# Patient Record
Sex: FEMALE | Employment: UNEMPLOYED | URBAN - METROPOLITAN AREA
[De-identification: names, ages, dates, MRNs, and addresses within clinical notes are randomized per-mention and may not be internally consistent; named-entity substitution may affect disease eponyms.]

---

## 2023-11-07 ENCOUNTER — HOSPITAL ENCOUNTER (EMERGENCY)
Facility: CLINIC | Age: 48
Discharge: LEFT BEFORE DISCHARGE | End: 2023-11-07
Admitting: EMERGENCY MEDICINE
Payer: COMMERCIAL

## 2023-11-07 VITALS
RESPIRATION RATE: 14 BRPM | OXYGEN SATURATION: 95 % | TEMPERATURE: 99.5 F | SYSTOLIC BLOOD PRESSURE: 126 MMHG | DIASTOLIC BLOOD PRESSURE: 87 MMHG | HEART RATE: 107 BPM

## 2023-11-07 PROCEDURE — 258N000003 HC RX IP 258 OP 636: Performed by: EMERGENCY MEDICINE

## 2023-11-07 PROCEDURE — 99281 EMR DPT VST MAYX REQ PHY/QHP: CPT

## 2023-11-07 RX ADMIN — SODIUM CHLORIDE 1000 ML: 9 INJECTION, SOLUTION INTRAVENOUS at 12:53

## 2023-11-07 ASSESSMENT — ACTIVITIES OF DAILY LIVING (ADL): ADLS_ACUITY_SCORE: 33

## 2023-11-07 NOTE — ED TRIAGE NOTES
Pt presents to the ED via EMS for evaluation of sore throat, per EMS report - at  for sore throat, sent to ED for neck imaging,  could not obtain an IV.      Triage Assessment (Adult)       Row Name 11/07/23 1224          Triage Assessment    Airway WDL WDL

## 2023-11-07 NOTE — ED NOTES
Patient had syncopal episode in triage. Moved to closed off area for monitoring. Fluid bolus started.

## 2024-07-10 ENCOUNTER — HOSPITAL ENCOUNTER (EMERGENCY)
Facility: CLINIC | Age: 49
Discharge: HOME | End: 2024-07-10
Payer: COMMERCIAL

## 2024-07-10 ENCOUNTER — APPOINTMENT (OUTPATIENT)
Dept: GENERAL RADIOLOGY | Facility: CLINIC | Age: 49
End: 2024-07-10
Payer: COMMERCIAL

## 2024-07-10 VITALS
RESPIRATION RATE: 18 BRPM | BODY MASS INDEX: 25.31 KG/M2 | OXYGEN SATURATION: 99 % | HEIGHT: 65 IN | HEART RATE: 80 BPM | TEMPERATURE: 97.7 F | SYSTOLIC BLOOD PRESSURE: 125 MMHG | WEIGHT: 151.9 LBS | DIASTOLIC BLOOD PRESSURE: 78 MMHG

## 2024-07-10 DIAGNOSIS — J02.9 PHARYNGITIS: ICD-10-CM

## 2024-07-10 DIAGNOSIS — J18.9 ATYPICAL PNEUMONIA: ICD-10-CM

## 2024-07-10 LAB
FLUAV RNA SPEC QL NAA+PROBE: NEGATIVE
FLUBV RNA RESP QL NAA+PROBE: NEGATIVE
GROUP A STREP BY PCR: NOT DETECTED
MONOCYTES NFR BLD AUTO: NEGATIVE %
RSV RNA SPEC NAA+PROBE: NEGATIVE
SARS-COV-2 RNA RESP QL NAA+PROBE: NEGATIVE

## 2024-07-10 PROCEDURE — 36415 COLL VENOUS BLD VENIPUNCTURE: CPT

## 2024-07-10 PROCEDURE — 99284 EMERGENCY DEPT VISIT MOD MDM: CPT | Mod: 25

## 2024-07-10 PROCEDURE — 86308 HETEROPHILE ANTIBODY SCREEN: CPT

## 2024-07-10 PROCEDURE — 71046 X-RAY EXAM CHEST 2 VIEWS: CPT

## 2024-07-10 PROCEDURE — 87651 STREP A DNA AMP PROBE: CPT

## 2024-07-10 PROCEDURE — 87637 SARSCOV2&INF A&B&RSV AMP PRB: CPT

## 2024-07-10 RX ORDER — PREDNISONE 20 MG/1
TABLET ORAL
Qty: 10 TABLET | Refills: 0 | Status: SHIPPED | OUTPATIENT
Start: 2024-07-10

## 2024-07-10 RX ORDER — AZITHROMYCIN 250 MG/1
TABLET, FILM COATED ORAL
Qty: 6 TABLET | Refills: 0 | Status: SHIPPED | OUTPATIENT
Start: 2024-07-10 | End: 2024-07-15

## 2024-07-10 ASSESSMENT — ACTIVITIES OF DAILY LIVING (ADL)
ADLS_ACUITY_SCORE: 33
ADLS_ACUITY_SCORE: 35

## 2024-07-10 ASSESSMENT — COLUMBIA-SUICIDE SEVERITY RATING SCALE - C-SSRS
2. HAVE YOU ACTUALLY HAD ANY THOUGHTS OF KILLING YOURSELF IN THE PAST MONTH?: NO
6. HAVE YOU EVER DONE ANYTHING, STARTED TO DO ANYTHING, OR PREPARED TO DO ANYTHING TO END YOUR LIFE?: NO
1. IN THE PAST MONTH, HAVE YOU WISHED YOU WERE DEAD OR WISHED YOU COULD GO TO SLEEP AND NOT WAKE UP?: NO

## 2024-07-11 NOTE — ED PROVIDER NOTES
"  Emergency Department Note      History of Present Illness     Chief Complaint   Pharyngitis and Cough      HPI   Adry Ledbetter is an otherwise healthy 48 year old female who presents to the ED for evaluation of pharyngitis and cough. The patient reports she has had a sore throat for the past 3 weeks.  She notes that her lymph nodes feel swollen which is more significant on the right, a hoarse voice in the morning, fatigue, pain with swallowing and a subjective fever. Two days ago she had a productive cough with yellow/green in color mucus that has since improved. Patient was sick prior to traveling to [place] and used hot tea for relief.  She notes that she was diagnosed with an ear infection about 2 weeks ago and was on antibiotics for this. Denies recorded fever, neck stiffness, ear pain, shortness of breath, or vomiting.    Independent Historian   None    Review of External Notes   I reviewed the discharge summary from ED visit 6/23/24 where the patient was prescribed Omnicef.     Past Medical History     Medical History and Problem List   Neoplasm of brain   Migraines     Medications   No current outpatient medications on file.     Surgical History   No past surgical history on file.     Physical Exam     Patient Vitals for the past 24 hrs:   BP Temp Temp src Pulse Resp SpO2 Height Weight   07/10/24 2259 125/78 -- -- 80 18 99 % -- --   07/10/24 1927 138/88 97.7  F (36.5  C) Temporal 92 16 98 % 1.651 m (5' 5\") 68.9 kg (151 lb 14.4 oz)     Physical Exam  General: Alert, well developed, well nourished. Cooperative.     In moderate distress, appears fatigued  HEENT:  Head:  Atraumatic  Ears:  External ears are normal  Mouth/Throat:  Mild erythema to the posterior oropharynx, uvula midline, no asymmetry.  Mucous membranes are moist.   Eyes:   Conjunctivae normal and EOM are normal. No scleral icterus.    Pupils are equal, round, and reactive to light.   Neck:   Normal range of motion. Neck supple.  CV:  Normal " rate, regular rhythm, normal heart sounds and radial pulses are 2+ and symmetric.  No murmur.  Resp:  Breath sounds are clear bilaterally    Non-labored, no retractions or accessory muscle use  MS:  Normal range of motion.     Back atraumatic.  Skin:  Warm and dry.  No rash or lesions noted.  Neuro:   Alert. Normal strength.    Psych: Normal mood and affect.  Lymph: Anterior or posterior cervical lymphadenopathy noted.    Diagnostics     Lab Results   Labs Ordered and Resulted from Time of ED Arrival to Time of ED Departure   INFLUENZA A/B, RSV, & SARS-COV2 PCR - Normal       Result Value    Influenza A PCR Negative      Influenza B PCR Negative      RSV PCR Negative      SARS CoV2 PCR Negative     MONONUCLEOSIS SCREEN - Normal    Mononucleosis Screen Negative     GROUP A STREPTOCOCCUS PCR THROAT SWAB - Normal    Group A strep by PCR Not Detected         Imaging   XR Chest 2 Views   Final Result   IMPRESSION: Heart size is normal. Lungs are clear bilaterally. Mediastinum and visualized bony structures are unremarkable.        Independent Interpretation   CXR shows no evidence of pleural effusion or pneumothorax.    ED Course      Medications Administered   Medications - No data to display    Procedures   Procedures     Discussion of Management   None    ED Course   ED Course as of 07/11/24 0106   Wed Jul 10, 2024   2106 I obtained history and examined the patient as noted above.    2249 I updated the patient.        Optional/Additional Documentation  None    Medical Decision Making / Diagnosis     CMS Diagnoses: None    MIPS       None    MDM   Adry Ledbetter is a 48 year old female who presents for evaluation of upper respiratory symptoms as noted above.  This is consistent with pharyngitis and atypical pneumonia.  There is no signs at this point of serious bacterial infection such as OM, RPA, epiglottitis, PTA, strep pharyngitis, pneumonia, sinusitis, meningitis, bacteremia, serious bacterial infection.  Given  ongoing , I did a CXR to eval for pneumonia, which is negative.  Vital signs are within normal limits without evidence of hypotension, fever, tachycardia, or hypoxia.  COVID, influenza, RSV test are negative.  Strep test is negative.  Mononucleosis screen is negative.  Given that symptoms have been ongoing for more than 2 weeks I recommended treatment for atypical pneumonia with azithromycin as noted below.  I also prescribed to prednisone to take over the next few days given persistent pharyngitis.  Patient was advised to follow-up with a primary care provider this week for reevaluation with any ongoing symptoms.  She should return for any new or worsening symptoms.  Patient was in agreement with this plan and all questions were answered.      Disposition   The patient was discharged.     Diagnosis     ICD-10-CM    1. Pharyngitis  J02.9       2. Atypical pneumonia  J18.9            Discharge Medications   Discharge Medication List as of 7/10/2024 10:56 PM        START taking these medications    Details   azithromycin (ZITHROMAX Z-DEVIN) 250 MG tablet Two tablets on the first day, then one tablet daily for the next 4 days, Disp-6 tablet, R-0, E-Prescribe      predniSONE (DELTASONE) 20 MG tablet Take two tablets (= 40mg) each day for 5 (five) days, Disp-10 tablet, R-0, E-Prescribe               Scribe Disclosure:  I, Keri Jonas, am serving as a scribe at 8:27 PM on 7/10/2024 to document services personally performed by Priscila Chavez PA based on my observations and the provider's statements to me.        Priscila Chavez PA-C  07/11/24 0108

## 2024-07-11 NOTE — ED TRIAGE NOTES
Throat pain and cold symptoms for the past 3 weeks. Seen and swabbed for this, all workups negative. No difficulty swallowing. On assessment, no redness or swelling noted to throat.

## 2024-07-11 NOTE — DISCHARGE INSTRUCTIONS
